# Patient Record
Sex: FEMALE | Race: WHITE | NOT HISPANIC OR LATINO | Employment: OTHER
[De-identification: names, ages, dates, MRNs, and addresses within clinical notes are randomized per-mention and may not be internally consistent; named-entity substitution may affect disease eponyms.]

---

## 2018-04-16 ENCOUNTER — TRANSCRIBE ORDERS (OUTPATIENT)
Dept: SCHEDULING | Age: 68
End: 2018-04-16

## 2018-04-16 DIAGNOSIS — M05.9 SEROPOSITIVE RHEUMATOID ARTHRITIS (CMS/HCC): Primary | ICD-10-CM

## 2018-04-18 ENCOUNTER — APPOINTMENT (OUTPATIENT)
Dept: RADIOLOGY | Age: 68
End: 2018-04-18
Attending: INTERNAL MEDICINE
Payer: COMMERCIAL

## 2018-04-18 DIAGNOSIS — M05.9 SEROPOSITIVE RHEUMATOID ARTHRITIS (CMS/HCC): ICD-10-CM

## 2018-04-18 PROCEDURE — 73564 X-RAY EXAM KNEE 4 OR MORE: CPT | Mod: RT

## 2018-08-09 ENCOUNTER — TRANSCRIBE ORDERS (OUTPATIENT)
Dept: SCHEDULING | Age: 68
End: 2018-08-09

## 2018-08-09 DIAGNOSIS — R94.5 ABNORMAL RESULTS OF LIVER FUNCTION STUDIES: Primary | ICD-10-CM

## 2018-10-04 ENCOUNTER — TRANSCRIBE ORDERS (OUTPATIENT)
Dept: REGISTRATION | Age: 68
End: 2018-10-04

## 2018-10-04 ENCOUNTER — HOSPITAL ENCOUNTER (OUTPATIENT)
Dept: RADIOLOGY | Age: 68
Discharge: HOME | End: 2018-10-04
Attending: INTERNAL MEDICINE
Payer: COMMERCIAL

## 2018-10-04 DIAGNOSIS — R94.5 ABNORMAL RESULTS OF LIVER FUNCTION STUDIES: ICD-10-CM

## 2018-10-04 PROCEDURE — 76700 US EXAM ABDOM COMPLETE: CPT

## 2019-02-11 ENCOUNTER — TRANSCRIBE ORDERS (OUTPATIENT)
Dept: SCHEDULING | Age: 69
End: 2019-02-11

## 2019-02-11 DIAGNOSIS — K76.0 FATTY (CHANGE OF) LIVER, NOT ELSEWHERE CLASSIFIED: Primary | ICD-10-CM

## 2019-04-02 ENCOUNTER — TRANSCRIBE ORDERS (OUTPATIENT)
Dept: SCHEDULING | Age: 69
End: 2019-04-02

## 2019-04-02 DIAGNOSIS — K76.0 FATTY (CHANGE OF) LIVER, NOT ELSEWHERE CLASSIFIED: Primary | ICD-10-CM

## 2019-04-18 ENCOUNTER — HOSPITAL ENCOUNTER (OUTPATIENT)
Dept: RADIOLOGY | Facility: HOSPITAL | Age: 69
Discharge: HOME | End: 2019-04-18
Attending: INTERNAL MEDICINE
Payer: COMMERCIAL

## 2019-04-18 DIAGNOSIS — K76.0 FATTY (CHANGE OF) LIVER, NOT ELSEWHERE CLASSIFIED: ICD-10-CM

## 2019-04-18 PROCEDURE — 76705 ECHO EXAM OF ABDOMEN: CPT

## 2019-07-16 ENCOUNTER — TRANSCRIBE ORDERS (OUTPATIENT)
Dept: SCHEDULING | Age: 69
End: 2019-07-16

## 2019-08-15 ENCOUNTER — TRANSCRIBE ORDERS (OUTPATIENT)
Dept: SCHEDULING | Age: 69
End: 2019-08-15

## 2019-08-15 DIAGNOSIS — Z12.31 ENCOUNTER FOR SCREENING MAMMOGRAM FOR MALIGNANT NEOPLASM OF BREAST: Primary | ICD-10-CM

## 2019-08-15 DIAGNOSIS — M81.0 AGE-RELATED OSTEOPOROSIS WITHOUT CURRENT PATHOLOGICAL FRACTURE: Primary | ICD-10-CM

## 2019-08-21 ENCOUNTER — HOSPITAL ENCOUNTER (OUTPATIENT)
Dept: RADIOLOGY | Age: 69
Discharge: HOME | End: 2019-08-21
Attending: INTERNAL MEDICINE
Payer: COMMERCIAL

## 2019-08-21 DIAGNOSIS — Z12.31 ENCOUNTER FOR SCREENING MAMMOGRAM FOR MALIGNANT NEOPLASM OF BREAST: ICD-10-CM

## 2019-08-21 PROCEDURE — 77063 BREAST TOMOSYNTHESIS BI: CPT

## 2019-09-25 ENCOUNTER — HOSPITAL ENCOUNTER (OUTPATIENT)
Dept: RADIOLOGY | Age: 69
Discharge: HOME | End: 2019-09-25
Attending: INTERNAL MEDICINE
Payer: COMMERCIAL

## 2019-09-25 DIAGNOSIS — M81.0 AGE-RELATED OSTEOPOROSIS WITHOUT CURRENT PATHOLOGICAL FRACTURE: ICD-10-CM

## 2019-09-25 PROCEDURE — 77080 DXA BONE DENSITY AXIAL: CPT

## 2020-02-13 ENCOUNTER — TRANSCRIBE ORDERS (OUTPATIENT)
Dept: SCHEDULING | Age: 70
End: 2020-02-13

## 2020-02-13 DIAGNOSIS — M54.9 DORSALGIA, UNSPECIFIED: Primary | ICD-10-CM

## 2020-02-19 ENCOUNTER — HOSPITAL ENCOUNTER (OUTPATIENT)
Dept: RADIOLOGY | Age: 70
Discharge: HOME | End: 2020-02-19
Attending: INTERNAL MEDICINE
Payer: COMMERCIAL

## 2020-02-19 DIAGNOSIS — M54.9 DORSALGIA, UNSPECIFIED: ICD-10-CM

## 2020-02-19 PROCEDURE — 72100 X-RAY EXAM L-S SPINE 2/3 VWS: CPT

## 2020-08-27 ENCOUNTER — TRANSCRIBE ORDERS (OUTPATIENT)
Dept: SCHEDULING | Age: 70
End: 2020-08-27

## 2020-08-27 DIAGNOSIS — Z12.31 ENCOUNTER FOR SCREENING MAMMOGRAM FOR MALIGNANT NEOPLASM OF BREAST: Primary | ICD-10-CM

## 2020-09-03 ENCOUNTER — HOSPITAL ENCOUNTER (OUTPATIENT)
Dept: RADIOLOGY | Age: 70
Discharge: HOME | End: 2020-09-03
Attending: INTERNAL MEDICINE
Payer: COMMERCIAL

## 2020-09-03 DIAGNOSIS — Z12.31 ENCOUNTER FOR SCREENING MAMMOGRAM FOR MALIGNANT NEOPLASM OF BREAST: ICD-10-CM

## 2020-09-03 PROCEDURE — 77063 BREAST TOMOSYNTHESIS BI: CPT

## 2020-10-23 ENCOUNTER — TRANSCRIBE ORDERS (OUTPATIENT)
Dept: LAB | Age: 70
End: 2020-10-23

## 2020-10-23 ENCOUNTER — HOSPITAL ENCOUNTER (OUTPATIENT)
Dept: RADIOLOGY | Age: 70
Discharge: HOME | End: 2020-10-23
Attending: PHYSICIAN ASSISTANT
Payer: COMMERCIAL

## 2020-10-23 DIAGNOSIS — M54.50 LOW BACK PAIN: Primary | ICD-10-CM

## 2020-10-23 DIAGNOSIS — M54.50 LOW BACK PAIN: ICD-10-CM

## 2020-10-23 PROCEDURE — 72100 X-RAY EXAM L-S SPINE 2/3 VWS: CPT

## 2021-09-20 ENCOUNTER — TRANSCRIBE ORDERS (OUTPATIENT)
Dept: SCHEDULING | Age: 71
End: 2021-09-20
Payer: COMMERCIAL

## 2021-09-20 DIAGNOSIS — Z12.31 ENCOUNTER FOR SCREENING MAMMOGRAM FOR MALIGNANT NEOPLASM OF BREAST: Primary | ICD-10-CM

## 2021-09-27 ENCOUNTER — TRANSCRIBE ORDERS (OUTPATIENT)
Dept: SCHEDULING | Age: 71
End: 2021-09-27
Payer: COMMERCIAL

## 2021-09-27 DIAGNOSIS — M81.0 AGE-RELATED OSTEOPOROSIS WITHOUT CURRENT PATHOLOGICAL FRACTURE: Primary | ICD-10-CM

## 2021-10-07 ENCOUNTER — HOSPITAL ENCOUNTER (OUTPATIENT)
Dept: RADIOLOGY | Age: 71
Discharge: HOME | End: 2021-10-07
Attending: INTERNAL MEDICINE
Payer: COMMERCIAL

## 2021-10-07 DIAGNOSIS — Z12.31 ENCOUNTER FOR SCREENING MAMMOGRAM FOR MALIGNANT NEOPLASM OF BREAST: ICD-10-CM

## 2021-10-07 PROCEDURE — 77067 SCR MAMMO BI INCL CAD: CPT

## 2021-11-08 ENCOUNTER — HOSPITAL ENCOUNTER (OUTPATIENT)
Dept: RADIOLOGY | Age: 71
Discharge: HOME | End: 2021-11-08
Attending: INTERNAL MEDICINE
Payer: COMMERCIAL

## 2021-11-08 DIAGNOSIS — M81.0 AGE-RELATED OSTEOPOROSIS WITHOUT CURRENT PATHOLOGICAL FRACTURE: ICD-10-CM

## 2021-11-08 PROCEDURE — 77080 DXA BONE DENSITY AXIAL: CPT

## 2022-07-15 ENCOUNTER — HOSPITAL ENCOUNTER (OUTPATIENT)
Dept: RADIOLOGY | Age: 72
Discharge: HOME | End: 2022-07-15
Attending: INTERNAL MEDICINE
Payer: COMMERCIAL

## 2022-07-15 ENCOUNTER — TRANSCRIBE ORDERS (OUTPATIENT)
Dept: SCHEDULING | Age: 72
End: 2022-07-15

## 2022-07-15 DIAGNOSIS — M54.9 DORSALGIA, UNSPECIFIED: ICD-10-CM

## 2022-07-15 DIAGNOSIS — M54.9 DORSALGIA, UNSPECIFIED: Primary | ICD-10-CM

## 2022-07-15 PROCEDURE — 72110 X-RAY EXAM L-2 SPINE 4/>VWS: CPT

## 2022-10-14 ENCOUNTER — TRANSCRIBE ORDERS (OUTPATIENT)
Dept: RADIOLOGY | Age: 72
End: 2022-10-14

## 2022-10-14 ENCOUNTER — HOSPITAL ENCOUNTER (OUTPATIENT)
Dept: RADIOLOGY | Age: 72
Discharge: HOME | End: 2022-10-14
Attending: INTERNAL MEDICINE
Payer: COMMERCIAL

## 2022-10-14 DIAGNOSIS — Z12.31 ENCOUNTER FOR SCREENING MAMMOGRAM FOR MALIGNANT NEOPLASM OF BREAST: ICD-10-CM

## 2022-10-14 DIAGNOSIS — Z12.31 ENCOUNTER FOR SCREENING MAMMOGRAM FOR MALIGNANT NEOPLASM OF BREAST: Primary | ICD-10-CM

## 2022-10-14 PROCEDURE — 77067 SCR MAMMO BI INCL CAD: CPT

## 2023-07-17 ENCOUNTER — TRANSCRIBE ORDERS (OUTPATIENT)
Dept: SCHEDULING | Age: 73
End: 2023-07-17

## 2023-07-17 DIAGNOSIS — M85.88 OTHER SPECIFIED DISORDERS OF BONE DENSITY AND STRUCTURE, OTHER SITE: ICD-10-CM

## 2023-07-17 DIAGNOSIS — Z12.31 ENCOUNTER FOR SCREENING MAMMOGRAM FOR MALIGNANT NEOPLASM OF BREAST: Primary | ICD-10-CM

## 2024-03-19 ENCOUNTER — OFFICE VISIT (OUTPATIENT)
Dept: NEUROLOGY | Facility: CLINIC | Age: 74
End: 2024-03-19
Payer: COMMERCIAL

## 2024-03-19 VITALS
OXYGEN SATURATION: 98 % | WEIGHT: 149 LBS | HEART RATE: 77 BPM | HEIGHT: 62 IN | SYSTOLIC BLOOD PRESSURE: 138 MMHG | DIASTOLIC BLOOD PRESSURE: 82 MMHG | BODY MASS INDEX: 27.42 KG/M2

## 2024-03-19 DIAGNOSIS — G31.84 MILD COGNITIVE IMPAIRMENT: Primary | ICD-10-CM

## 2024-03-19 PROBLEM — I10 HTN (HYPERTENSION), BENIGN: Status: ACTIVE | Noted: 2024-03-18

## 2024-03-19 PROCEDURE — 3075F SYST BP GE 130 - 139MM HG: CPT | Performed by: PSYCHIATRY & NEUROLOGY

## 2024-03-19 PROCEDURE — 3079F DIAST BP 80-89 MM HG: CPT | Performed by: PSYCHIATRY & NEUROLOGY

## 2024-03-19 PROCEDURE — 99205 OFFICE O/P NEW HI 60 MIN: CPT | Performed by: PSYCHIATRY & NEUROLOGY

## 2024-03-19 PROCEDURE — 3008F BODY MASS INDEX DOCD: CPT | Performed by: PSYCHIATRY & NEUROLOGY

## 2024-03-19 RX ORDER — VIT C/E/ZN/COPPR/LUTEIN/ZEAXAN 250MG-90MG
CAPSULE ORAL DAILY
COMMUNITY
End: 2025-01-21 | Stop reason: ALTCHOICE

## 2024-03-19 RX ORDER — AMLODIPINE BESYLATE 10 MG/1
1 TABLET ORAL DAILY
COMMUNITY
Start: 2024-01-02

## 2024-03-19 RX ORDER — ADALIMUMAB 40MG/0.4ML
40 KIT SUBCUTANEOUS
COMMUNITY

## 2024-03-19 RX ORDER — TURMERIC 400 MG
CAPSULE ORAL
COMMUNITY
End: 2025-01-21 | Stop reason: ALTCHOICE

## 2024-03-19 RX ORDER — CHOLECALCIFEROL (VITAMIN D3) 50 MCG
2000 TABLET ORAL DAILY
COMMUNITY
End: 2025-01-21 | Stop reason: ALTCHOICE

## 2024-03-19 RX ORDER — DULOXETIN HYDROCHLORIDE 30 MG/1
CAPSULE, DELAYED RELEASE ORAL
COMMUNITY
Start: 2024-02-21

## 2024-03-19 RX ORDER — ASCORBIC ACID 125 MG
1 TABLET,CHEWABLE ORAL DAILY
COMMUNITY
End: 2025-01-21 | Stop reason: ALTCHOICE

## 2024-03-19 RX ORDER — FOLIC ACID 0.4 MG
400 TABLET ORAL DAILY
COMMUNITY
End: 2025-01-21 | Stop reason: ALTCHOICE

## 2024-03-19 NOTE — PROGRESS NOTES
"  Neurology Consult Note    Subjective     Stephanie Sorto is a 73 y.o. female evaluated regarding memory loss.    Stephanie is right-hand dominant.  She grew up in [place].  She had some college and then went to [employer] school.  She worked as a  and is a .  She  and moved to [place], [place].  She then .  She moved to [place] 17 years ago to help her daughter with their .  She lives with the daughter for 6 years.  She now lives independently in a senior apartment.  She complains \"I feel like I am losing memory\".  \"When I first started forgetting things I was panicked\".  She recalls talking to a friend about her oldest grandchild and could not think of the grandchild's name.  She says that she is \"writing everything down\".  This has been going on for 6 months or more.  She manages all of her own finances and affairs.  She has no difficulty with independent function.  She says she generally sleeps well but did not sleep last night as she was anxious about this appointment.  She is also did not sleep well Saturday night.  She says she did not remember her daughter's birthday this year.  \"Sometimes I think I have too much time on my hands\".  She mentioned that her birth mother had dementia but is uncertain at what age.  She did repeat this.    Medical History:   Past Medical History:   Diagnosis Date    HLD (hyperlipidemia)     RA (rheumatoid arthritis) (CMS/[facility])        Surgical History: No past surgical history on file.    Allergies: Sulfa (sulfonamide antibiotics)    Current Outpatient Medications   Medication Sig Dispense Refill    adalimumab (HUMIRA,CF,) 40 mg/0.4 mL syringe kit Inject 40 mg under the skin.      amLODIPine (NORVASC) 10 mg tablet Take 1 tablet by mouth daily.      calcium carbonate (CALCIUM 600 ORAL) Take by mouth.      cholecalciferol, vitamin D3, 50 mcg (2,000 unit) tablet Take 2,000 Units by mouth daily.      DULoxetine (CYMBALTA) 30 mg capsule take " "1 capsule by mouth daily as directed      folic acid (FOLVITE) 400 mcg tablet Take 400 mcg by mouth daily.      magnesium citrate 125 mg capsule Take 1 capsule by mouth daily.      predniSONE 2 mg tablet,delayed release (DR/EC) Take by mouth.      turmeric 400 mg capsule Take by mouth.      vitamin E, dl,tocopheryl acet, (vitamin E, dl, acetate,) 180 mg capsule Take by mouth daily.       No current facility-administered medications for this visit.         Family History:   Family History   Problem Relation Age of Onset    Dementia Biological Mother     Arthritis Biological Sister        Social History:   Social History     Socioeconomic History    Marital status: Single     Spouse name: None    Number of children: None    Years of education: None    Highest education level: None   Tobacco Use    Smoking status: Never    Smokeless tobacco: Never   Substance and Sexual Activity    Alcohol use: Never       Review of Systems  Constitutional: negative  Eyes: negative  Ears, nose, mouth, throat, and face: negative  Respiratory: negative  Cardiovascular: negative  Gastrointestinal: negative  Genitourinary:negative  Integument/breast: negative  Hematologic/lymphatic: negative  Musculoskeletal:positive for joint pains, has had both knees injected  Neurological: negative  Behavioral/Psych: negative  Endocrine: negative  Allergic/Immunologic: negative        Objective     Physical Exam  Visit Vitals  /82   Pulse 77   Ht 1.575 m (5' 2\")   Wt 67.6 kg (149 lb)   SpO2 98%   BMI 27.25 kg/m²       General Appearance:  Alert, no distress, appears stated age   Head:  Normocephalic, atraumatic   Eyes:  PERRL,  EOM's intact, fundi benign                 Neck: Supple,  no carotid bruit         Lungs:  Clear to auscultation bilaterally, respirations unlabored       Heart Regular rate and rhythm, S1 and S2 normal, no murmur       Extremities:  no clubbing, cyanosis or edema    Musculoskeletal: No injury or deformity       Skin: Skin " color, texture, turgor normal; no rashes or lesions   Behavior/Emotional: Appropriate, cooperative   Neurologic Exam:  Higher cortical function:  Alert and conversant.  Language intact.  Attention, concentration, memory impaired.  MMSE 24/30.  Verbal recall 0/3 spontaneously at 5 minutes and 2/3 with categorical hints.  She could not think of the year.  She has trouble spelling world backwards.      Cranial nerve exam:  Pupils equal round and reactive to light. Extraocular movement full with normal pursuit and saccades. No nystagmus.  Facial movement and sensation is normal. Hearing intact.  The tongue and uvula were midline. Speech clear.     Motor exam:  Normal strength and rapid movements throughout. No pronator drift.There was normal bulk and tone with no abnormal movements.     Reflexes : 1+ and symmetric with absent ankles and flexor plantar responses.     Sensory examination: intact to vibration and graphaesthesia.    Cerebellar exam: no dysmetria.     Gait: Narrow based and independent though mildly antalgic.       Assessment and Plan    Mild cognitive impairment  Patient has been forgetful though is functioning independently.  Mini-Mental state exam score was 24/30.  Her spontaneous verbal recall was 0/3 at 5 minutes.  She could not think of the year.  It took her a long time to come up with the name of her rheumatologist.  She has concerns and that her birth mother had dementia.  I have asked her to undergo MRI brain scan as well as neuropsychological testing.  I will see her thereafter.      Bruce Cherry MD  8:50 AM

## 2024-03-19 NOTE — ASSESSMENT & PLAN NOTE
Patient has been forgetful though is functioning independently.  Mini-Mental state exam score was 24/30.  Her spontaneous verbal recall was 0/3 at 5 minutes.  She could not think of the year.  It took her a long time to come up with the name of her rheumatologist.  She has concerns in that her birth mother had dementia.  I have asked her to undergo MRI brain scan as well as neuropsychological testing.  I will see her thereafter.

## 2024-04-04 ENCOUNTER — HOSPITAL ENCOUNTER (OUTPATIENT)
Dept: RADIOLOGY | Age: 74
Discharge: HOME | End: 2024-04-04
Attending: PSYCHIATRY & NEUROLOGY
Payer: COMMERCIAL

## 2024-04-04 DIAGNOSIS — G31.84 MILD COGNITIVE IMPAIRMENT: ICD-10-CM

## 2024-04-08 ENCOUNTER — TELEPHONE (OUTPATIENT)
Dept: NEUROLOGY | Facility: CLINIC | Age: 74
End: 2024-04-08

## 2024-04-09 NOTE — TELEPHONE ENCOUNTER
MRI reviewed.  This reported: Mild to moderate small vessel ischemic changes with age-related volume loss.   To my review this was at least moderate small vessel ischemic change and there were enlarged temporal horns of the lateral ventricles.  I phoned the patient and discussed her MRI.  I asked if she had scheduled her neuropsychological testing.  She did not know what I was talking about in this regard.  Please assist this patient in setting up neuropsychological testing.  Her memory issues have gotten in the way of her being able to successfully do so.

## 2024-07-09 ENCOUNTER — OFFICE VISIT (OUTPATIENT)
Dept: NEUROLOGY | Facility: CLINIC | Age: 74
End: 2024-07-09
Payer: COMMERCIAL

## 2024-07-09 VITALS
HEIGHT: 62 IN | DIASTOLIC BLOOD PRESSURE: 80 MMHG | SYSTOLIC BLOOD PRESSURE: 142 MMHG | BODY MASS INDEX: 30 KG/M2 | OXYGEN SATURATION: 96 % | HEART RATE: 79 BPM | WEIGHT: 163 LBS

## 2024-07-09 DIAGNOSIS — G31.84 MILD COGNITIVE IMPAIRMENT: Primary | ICD-10-CM

## 2024-07-09 PROCEDURE — 3079F DIAST BP 80-89 MM HG: CPT | Performed by: PSYCHIATRY & NEUROLOGY

## 2024-07-09 PROCEDURE — 3008F BODY MASS INDEX DOCD: CPT | Performed by: PSYCHIATRY & NEUROLOGY

## 2024-07-09 PROCEDURE — 99215 OFFICE O/P EST HI 40 MIN: CPT | Performed by: PSYCHIATRY & NEUROLOGY

## 2024-07-09 PROCEDURE — 3077F SYST BP >= 140 MM HG: CPT | Performed by: PSYCHIATRY & NEUROLOGY

## 2024-07-09 NOTE — ASSESSMENT & PLAN NOTE
The patient has had cognitive impairments noted by family.  She was the victim of the financial scam and now the daughter is her POA.  Apparently patient has not been taking any medication.  She has had problems with alcoholism in the past.  It is unclear how much alcohol she has been recently consuming.  Her cognitive impairment could well be on a vascular basis given the moderate chronic ischemic changes on MRI brain.  There could also be effects of chronic alcohol exposure.  Certainly the patient should be on vascular risk factor control and antiplatelet therapy.  I have asked her to undergo formal neuropsychological testing.  I will see her thereafter.

## 2024-07-09 NOTE — PROGRESS NOTES
"Neurology Progress Note    Subjective     Stephanie Sorto is a 73 y.o. female evaluated regarding memory loss.  She comes in today with her daughter and Latisha.     Stephanie was initially evaluated 3/19/2024.  She is right-hand dominant.  She grew up in [place].  She had some college and then went to [employer] school.  She worked as a  and is a .  She  and moved to [place], [place].  She then .  She moved to [place] 17 years ago to help her daughter with their .  She lived with the daughter for 6 years and then moved independently in a senior apartment.  She complained \"I feel like I am losing memory\".  \"When I first started forgetting things I was panicked\".  She recalled talking to a friend about her oldest grandchild and could not think of the grandchild's name.  She was \"writing everything down\" for 6 months or more.  She managed all of her own finances and affairs.  She had no difficulty with independent function.  She generally slept well but did not sleep last night as she was anxious about this appointment.  She also did not sleep well Saturday night.  She says she did not remember her daughter's birthday this year.  \"Sometimes I think I have too much time on my hands\".  She mentioned that her birth mother had dementia but was uncertain at what age.  She did repeat this.  MMSE was 24/30 with verbal recall 0/3 spontaneously at 5 minutes and 2/3 with categorical hints.  I felt she had mild cognitive impairment and asked her to undergo MRI brain and neuropsychological testing.   She comes in today with her daughter.  MRI brain 2024 reported mild to moderate small vessel ischemic changes with age-related volume loss.  To my review this was at least moderately severe.  Patient was seen at the [facility] ER 2024 after mental status change at a restaurant after 2 martinis.  She had alcohol intoxication with level 265.  She does have a history of " being treated for alcoholism in her earlier adulthood per the daughter.  Apparently the patient was a victim of a financial scam.  The daughter got a call from the bank when the patient asked for alone.  After going to [employer] she was directed to the [place] police department where the patient made a report but seemed confused according to the police and the daughter was called.  Daughter is now recent POA.  The daughter notices more short-term memory loss.  Patient is having difficulty adjusting to having a new bank.  1 day she made 10 5 calls to the daughter.  She asked the same question repeatedly. She did not recall seeing her PCP Dr. Mack 5/29/2024.  The patient is not taking any medications although these have been prescribed.      Medical History:   Past Medical History:   Diagnosis Date    HLD (hyperlipidemia)     RA (rheumatoid arthritis) (CMS/[facility])        Surgical History: No past surgical history on file.    Allergies: Sulfa (sulfonamide antibiotics)    Current Outpatient Medications   Medication Sig Dispense Refill    adalimumab (HUMIRA,CF,) 40 mg/0.4 mL syringe kit Inject 40 mg under the skin.      amLODIPine (NORVASC) 10 mg tablet Take 1 tablet by mouth daily.      calcium carbonate (CALCIUM 600 ORAL) Take by mouth.      cholecalciferol, vitamin D3, 50 mcg (2,000 unit) tablet Take 2,000 Units by mouth daily.      DULoxetine (CYMBALTA) 30 mg capsule take 1 capsule by mouth daily as directed      folic acid (FOLVITE) 400 mcg tablet Take 400 mcg by mouth daily.      magnesium citrate 125 mg capsule Take 1 capsule by mouth daily.      predniSONE 2 mg tablet,delayed release (DR/EC) Take by mouth.      turmeric 400 mg capsule Take by mouth.      vitamin E, dl,tocopheryl acet, (vitamin E, dl, acetate,) 180 mg capsule Take by mouth daily.       No current facility-administered medications for this visit.         Family History:   Family History   Problem Relation Age of Onset    Dementia Biological  "Mother     Arthritis Biological Sister        Social History:   Social History     Socioeconomic History    Marital status: Single     Spouse name: None    Number of children: None    Years of education: None    Highest education level: None   Tobacco Use    Smoking status: Never    Smokeless tobacco: Never   Substance and Sexual Activity    Alcohol use: Never         Objective     Physical Exam  Visit Vitals  BP (!) 142/80   Pulse 79   Ht 1.575 m (5' 2\")   Wt 73.9 kg (163 lb)   SpO2 96%   BMI 29.81 kg/m²     Higher cortical function: Mild cognitive impairment,, language intact.  Cranial nerves: Cranial nerves II through XII normal.  Motor: Normal strength and rapid movements throughout.    Cerebellum: No dysmetria.  Gait: Within normal limits.  General: Awake, alert, in no apparent distress.  HEENT: Normocephalic atraumatic.  Eyes: Orbits benign,  extraocular motions full.  Neck : Supple, no carotid bruit.    Lungs: Clear bilaterally.  Heart: S1 and S2 normal, regular rate and rhythm, no murmur.  Extremities: No clubbing, cyanosis, or edema.  Musculoskeletal: No injury or deformity.  Psychiatric: Appropriate and cooperative    Assessment and Plan    Mild cognitive impairment  The patient has had cognitive impairments noted by family.  She was the victim of the financial scam and now the daughter is her POA.  Apparently patient has not been taking any medication.  She has had problems with alcoholism in the past.  It is unclear how much alcohol she has been recently consuming.  Her cognitive impairment could well be on a vascular basis given the moderate chronic ischemic changes on MRI brain.  There could also be effects of chronic alcohol exposure.  Certainly the patient should be on vascular risk factor control and antiplatelet therapy.  I have asked her to undergo formal neuropsychological testing.  I will see her thereafter.      Bruce Cherry MD  6:20 PM  "

## 2024-10-24 ENCOUNTER — HOSPITAL ENCOUNTER (OUTPATIENT)
Dept: PSYCHOLOGY | Facility: CLINIC | Age: 74
Discharge: HOME | End: 2024-10-24
Payer: COMMERCIAL

## 2024-10-24 DIAGNOSIS — F02.80 MAJOR NEUROCOGNITIVE DISORDER DUE TO MULTIPLE ETIOLOGIES WITHOUT BEHAVIORAL DISTURBANCE (CMS/HCC): Primary | ICD-10-CM

## 2024-10-24 PROCEDURE — 96132 NRPSYC TST EVAL PHYS/QHP 1ST: CPT | Performed by: PSYCHOLOGIST

## 2024-10-24 PROCEDURE — 96133 NRPSYC TST EVAL PHYS/QHP EA: CPT | Performed by: PSYCHOLOGIST

## 2024-10-24 PROCEDURE — 96137 PSYCL/NRPSYC TST PHY/QHP EA: CPT | Performed by: PSYCHOLOGIST

## 2024-10-24 PROCEDURE — 96136 PSYCL/NRPSYC TST PHY/QHP 1ST: CPT | Performed by: PSYCHOLOGIST

## 2024-10-24 NOTE — PROGRESS NOTES
"NEUROPSYCHOLOGICAL EVALUATION    CONFIDENTIAL   FOR PROFESSIONAL USE ONLY    Name: Stephanie Sorto                              : 1950  Evaluation date: 10/24/2024    Stephanie Sorto, : 1950, is a 73 y.o. right-handed female with 13 years of formal education, referred by Neurologist Dr. Bruce Cherry MD for a comprehensive neuropsychological evaluation.    The following information was obtained via clinical interview with the patient and her daughter Latisha (with the patient's consent) on 10/24/2024 and a review of available medical records, though this should not be considered a comprehensive review and the reader is referred to the patient's complete medical record for a thorough summary. Verbal informed consent was obtained after a review/discussion of reasons for this evaluation, evaluation procedures, and issues regarding professional records and confidentiality.    HISTORY OF PRESENTING PROBLEM:  Per medical records, Ms. Sorto follows with neurologist Dr. Cherry for memory difficulty.  MMSE in 2024 was 24/30 with verbal recall 0/3 spontaneously at 5 minutes and 2/3 with categorical hints.  MRI in 2024 reported mild to moderate small vessel ischemic changes with age-related volume loss; Dr. Cherry reported that to his review, \"this was at least moderately severe.\"  Ms. Sorto was seen at [facility] ED in 2024 after mental status change at a restaurant after 2 martinis.  She was found to have alcohol intoxication with level 265.  She reportedly had alcohol use problems in the past, but is unclear how much alcohol she was recently consuming.  Her daughter recently became POA after she was the victim of financial scam.  At her appointment with Dr. Cherry in 2024, it was noted that she did not recall seeing her PCP Dr. Mack in May.  She reported at that appointment that she had not been taking any medications even though she is prescribed " "medications; it is unclear how long she was noncompliant.  Per Dr. Cherry's impression, \"her cognitive impairment could be on a vascular basis given the moderate chronic ischemic changes on MRI brain.  There could also be effects of chronic alcohol exposure.\"  Ms. Sorto has not undergone previous neuropsychological evaluation.    At the current appointment, her daughter Latisha described several years of memory decline and indicated that initially her mother used to become very upset about memory trouble, but now is less upset and just says \"my brain does not work.\"  Ms. Sorto feels that her memory difficulty is more age-related and stated that the neurologist told her it was normal aging; Latisha corrected her and said that Dr. Cherry had not told her it was normal aging.  Ms. Sorto feels cues are helpful in jogging her memory, but her daughter believes that cues are usually not helpful.  Latisha stated her mother is repetitive with questions and she now has difficulty with technology-things that she used to be able to do but no longer is able to understand.  She provided examples including logging into [employer], not understanding what email was, and trouble navigating the Internet.  They got rid of her smart phone in favor of a flip phone.  Regarding language, occasional word finding and comprehension difficulty were reported.  Regarding attention and processing, Latisha stated that her mother has trouble understanding and reasoning through new information.  She provided an example of getting her mother a separate bank card for her Social Security, but Ms. Sorto is unable to understand that it is not linked to her bank account and has tried to go to her bank to replace the card or take out cash instead of using her debit card. Latisha stated that information \"is not sinking in\" despite multiple explanations.  She indicated that her mother has \"good days and bad days,\" and noted that when her mother becomes " "upset, worried, or anxious, her cognition is much worse.  She may not be able to get out her words or thoughts.  Of note, Ms. Sorto fell for several financial scams, including believing she was dating Jensen Moore for over a year.  Latisha stated that multiple people reached out to her with concerns, including her mother's bank and a .  Latisha is now POA and the family had to freeze her credit, change chau (fired from a bank in [place] because she was sending money to [employer]), and even changed her telephone number.  When queried, Ms. Sorto demonstrated some awareness of the scams, but then later during the intake told the examiner \"that reminds me of something Jensen said to me.\"    Ms. Sorto lives in an independent apartment in a half-way community.  No difficulty with ADLs were reported, but changes in IADLs were indicated.  As noted above, her daughter has recently become her POA.  Ms. Sorto still writes checks for bills, but has trouble with her debit card.  She used to be a  and used to love to cook, but stated it is no longer satisfying to cook for 1 person so she purchases a lot of pre-prepared food.  She denied difficulty with recipes, but stated very occasional instances of forgetting to turn off the stove or water.  Regarding medication management, she was unable to state her medications or the conditions for which she is prescribed medications; even with prompting, she had trouble recognizing medications and even denied taking blood pressure medications.  She denied trouble remembering to take her meds, but then admitted that some days she forgets if she is out of her normal routine.  She does use a pillbox.  Ms. Sorto rarely drives as she does not have a car, but she has a friend who lets her use her vehicle.  She denied concerns with disorientation or difficulty with vision while driving.  Latisha stated that she is concerned about her mother's driving, and was " "recently following her in another car and her mother seemed \"a little all over the place, almost like she was distracted.\"  Latisha stated that her children have also commented on her mother's driving and are nervous to ride with her.    MEDICATION:  Acetaminophen (tylenol extra strength) 500 mg tablet, take 1 tablet by mouth daily.   Aleve pm 220-25 mg oral tablet (naproxen sod-diphenhydramine), take 1 tablet by mouth if needed.   Amlodipine 10 mg tablet, take 1 tablet by mouth daily.   Ascorbic acid 500 mg tablet, take 500 mg by mouth daily.   Cholecalciferol (vitamin d) 2000 units tablet, take 2,000 units by mouth daily.   Duloxetine 30 mg dr capsule, take 1 capsule by mouth daily as directed   Folic acid 1 mg tablet, take 1 mg by mouth daily.   Humira 40 mg/0.4 mL  Magnesium citrate 125 mg oral capsule, take 1 capsule by mouth daily.   Rosuvastatin 5 mg tablet, take 1 tablet by mouth daily at bedtime.     Ms. Sorto is also prescribed methotrexate by her rheumatologist, but dosage was unavailable.    MEDICAL HISTORY:  Ms. Sorto stated that to her knowledge, she was the product of an uncomplicated pregnancy and delivery and met developmental milestones appropriately. Medical history is significant for rheumatoid arthritis, hypertension, hyperlipidemia, and Vitamin D deficiency. She follows with Rheumatology (Flor Georges DO).  Latisha stated that her mother contracted COVID in 2021 or 2022 with moderate symptoms; she was not hospitalized, but was prescribed Paxlovid and was sick the first time for quite some time.  Latisha noted possible mild cognitive change after her illness.  No tremors or weakness was reported.  Ms. Sorto experienced frequent falls related to knee trouble, and now uses a rollator walker; Latisha stated that she has still fallen even with the rollator.  They denied history of head injury, stroke, and seizure.  MRI brain without contrast (2024) showed chronic microhemorrhages in the " "bilateral thalami and chronic lacunar infarcts in the right thalamus and right midbrain.  Mild to moderate probable chronic ischemic changes were noted of the white matter, with ventricles and sulci mildly prominent but age-appropriate cerebral volume; per Dr. Cherry's review, he noted more moderate to severe changes.  Ms. Sorto described her sleep as good, but she and her daughter endorsed longstanding snoring.  She has never undergone a sleep study.  No REM sleep disorder behaviors were reported.  Daytime energy level was poor.  Appetite was cited as good with no changes.    SUBSTANCE USE HISTORY:  Ms. Sorto states she no longer drinks alcohol since her June 2024 visit to the ED due to intoxication.  Latisha reported her mother went to rehab for alcohol abuse about 40 years ago.  They deny withdrawal symptoms or seizures.  Ms. Sorto reported recreational marijuana use throughout adulthood, as recently as last year.  She does not use tobacco.  Caffeine includes 1 cup of coffee daily and occasional tea.    PSYCHIATRIC HISTORY:  Psychiatric history is significant for depression in the past.  She has participated in family therapy.  She has not been psychiatrically hospitalized.  Ms. Sorto described her recent mood as \"good.\"  Latisha denied current concerns with changes in personality or mood, but stated that during the period of time of financial scamming in which her mother thought she was in a relationship with Jensen Moore, her relationship with her family was very poor.  She stated that her mother was very secretive during this scam and sad during the \"break up,\" but she feels that her mother is coming out of it now.  Ms. Sorto and Latisha denied suicidal/homicidal ideation, giuliana, hallucinations, paranoia, and delusions.    FAMILY HISTORY:  Family neurologic history is significant for Alzheimer's disease in her biological mother, and autoimmune and possible seizures in her sister.  Family " psychiatric history is significant for anxiety and depression throughout her family, and she stated that her father was an alcoholic.    SOCIAL, EDUCATIONAL, AND OCCUPATIONAL HISTORY:  Ms. Sorto was born and raised in [place] with 2 brothers and 1 sister.  She stated that her birth mother left her family when she was young.  Ms. Sorto was  to her first  briefly and to her second  for 18 years before they .  She has 3 children and 8 grandchildren, with one-step great grandchild.  She stated that she spends a lot of time alone and watches a lot of TV.  She used to play cards and do more activities at her living facility, but no longer enjoys these activities as much.  She stated that she does have good friends in her building.  Ms. Sorto denied difficulty with learning or attention in school and earned B-average grades.  She completed 1 year of college/culinary school.  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  Her last job was with [employer] and she retired in 2015 to help care for her grandchildren.    BEHAVIORAL OBSERVATIONS:  Ms. Sorto arrived on time and was accompanied by her daughter Latisha . She was neatly dressed and groomed appropriately for the evaluation and appeared to maintain proper hygiene. Her gait was unremarkable, balance appeared grossly steady, and she ambulated with the assistance of a rollator walker . She is right-handed and no gross motor abnormalities were observed. Vision (corrected with glasses) and hearing appeared adequate for the purposes of the evaluation. Eye contact was good. Ms. Sorto was very pleasant and cooperative and mood and affect were appropriate. Rapport was easily established and maintained. Speech was generally fluent, prosodic, and non-paraphasic, with occasional word-finding  difficulty. Thought processes were logical and goal-directed and she served as a variable historian with assistance from her daughter. She showed intact comprehension of conversational speech but required repetition and elaboration of test instructions. She maintained adequate attention and did not demonstrate impulsive behavior. Qualitative memory difficulty was noted, as she could not recall how many children one of her daughters had. She made 3 repetition errors on a phonemic fluency task; 1 error on each of three 60-second trials.    Performance Validity: Ms. Sorto performed adequately on embedded measures of effort and the results are believed to accurately represent her current neurocognitive function.    ASSESSMENT INSTRUMENTS: Animal Fluency; [place] Verbal Learning Test, Third Edition, Brief (CVLT-3 Brief); Clinical Interview; Clock Drawing Test; Controlled Oral Word Association Test (COWAT, FAS); Generalized Anxiety Disorder 7-Item Scale (MARIA TERESA-7); [place] IADLs; Line Bisection; Mini Mental Status Exam (MMSE); Neuropsychological Assessment Battery (NAB; Language Module); Overlapping Figures; Patient Health Questionnaire (PHQ-9); Repeatable Battery for the Assessment of Neuropsychological Status (RBANS, Form A; selected subtests); Review of Records; Test of Practical Judgment (TOPJ-9); Trail Making Test A & B; Wechsler Adult Intelligence Scale, Fourth Edition (WAIS-IV; selected subtests); Wechsler Memory Scale, Fourth Edition (WMS-IV; selected subtests); Wide Range Achievement Test, Fourth Edition (WRAT-4; Word Reading).     SUMMARY OF TEST RESULTS:   The following standardized test scores and qualitative descriptors are used to describe test performance:     Standard Scores  Scaled Scores T-Scores Percentiles Classification   130 and above 16 and above 70 and above >98th Very High    120-129 14-15 63-69 91st to 97th High   110-119 12-13 57-62 75th to 90th High Average    8-11 43-56 25th to 74th  Average   80-89 6-7 37-42 9th to 24th Low Average   70-79 4-5 30-36 3rd to 8th Low    69 and below 3 and below 29 and below <2nd Very Low        These scores are for confidential and professional use only and should never be interpreted without consideration of the preceding integrated narrative report.      Test Raw Score Normative Score Description   Mental Status        MMSE 25/30 -- -- Within Normal Limits   Estimated Premorbid Functioning       WRAT-4 Word Reading  63  Average   Attention and Information Processing       WAIS-IV Working Memory Index          Digit Span 23 ss 9 Average         Forward 10 ss 10 Average         Backward 8 ss 10 Average         Sequencing 5 ss 7 Low Average   WAIS-IV Processing Speed Index          Symbol Search 19 ss 8 Average      Coding 40 ss 8 Average   Trail Making Test A 37” SS 97 Average   Executive Functioning       Trail Making Test B D/C SS -- Impaired   Clock Drawing Test 8/10 -- -- Borderline Impairment    TOPJ-9 21 SS 88 Low Average   Language        NAB Naming Test 29 T 47 Average   COWAT (FAS) 30 SS 88 Low Average   Animal Fluency 13 SS 78 Low   Visuospatial        Line Bisection -- -- -- Within Normal Limits   Overlapping Figures 13/13 -- -- Within Normal Limits   RBANS Figure Copy 14 ss 4 Low   Memory       CVLT-3 Brief          Trials 1 - 4 Correct (2-4-4-4) -- SS 58 Very Low      Short Delay Free Recall 2 ss 1 Very Low      Long Delay Free Recall 0 ss 1 Very Low      Long Delay Cued Recall 2 ss 1 Very Low      Recognition Discriminability  93 ss 9 Average   WMS-IV          Logical Memory I 19 ss 6 Low Average      Logical Memory II 6 ss 5 Low      LM Recognition 16 %ile 17-25 Low Average      Visual Reproduction I 20 ss 5 Low      Visual Reproduction II 4 ss 5 Low      VR Recognition  4 %ile 26-50 Average   Mood & Function       PHQ-9 2 -- -- Minimal Depression   MARIA TERESA-7 0 -- -- Minimal Anxiety   [place]ilda IADLs 7/8 -- -- Within Normal Limits     Mental Status  "Screening:  Ms. Sorto's performance was overall within normal limits on a cognitive screening measure (MMSE=25/30); she lost 4 points in orientation (stated date was November 13, did not know county the hospital was located in nor the county she lives in, and incorrect name of hospital: [facility]:) and 1 point for recalling 2/3 words after a brief delay.    Estimated Premorbid Function:  Single word reading ability was average.    Attention and Processing Speed:  Attention and processing speed were intact in the broad average range. When asked to repeat and manipulate progressively longer strings of digits, she performed in the average range. Processing speed on the WAIS-IV included average performances on tasks of rapid visual scanning and symbol-digit coding. General visual attention and scanning was average with 0 errors.    Executive Function:  Executive function was notable for weakness and impairment. Clock drawing was borderline impaired for hands drawn at 11 and 12 for \"10 minutes after 11.\" Mental flexibility was impaired for inability to complete the task secondary to confusion. When asked to provide problem-solving solutions to hypothetical real-world scenarios involving financial, safety, and medical decision-making, she performed in the low average range.    Language:  Language was notable for weakness. Object naming was average. Verbal fluency was low average for phonemic (letter) and low for semantic (category) fluency.    Visuoperceptual:  General visual fields and perception of overlapping figures were intact. Copy of a complex geometric figure was low for slight inaccuracies of details, but no obvious misperceptions.    Memory:   Abnormalities were noted in learning and free delayed recall . [place] verbal learning and delayed memory were very low. Ms. Sorto learned 2-4-4-4 items from a 9-item word list across 4 learning trials and freely recalled 0 items after a 10-minute delay; she " "recalled 2 items with category promts. Her recognition discriminability was average for 7 out of 9 words correctly recognized and 0 false positive errors. Contextual verbal learning of 2 stories was low average with low delayed recall but low average recognition memory. Visual learning and delayed memory of geometric designs were low, with average recognition.    Emotional/Behavior Rating: On self-report measures, Ms. Sorto denied significant symptoms of depression and anxiety. Latisha completed the Matilda, an informant-rated questionnaire regarding performance on instrumental activities of daily living (IADLs); she denied significant concerns with her mother's ability to use the telephone, shop independently, prepare adequate meals, manage housekeeping, perform laundry, travel independently, or manage day-to-day finances (needs help with banking, major purchases). She reported her mother needs reminding to take her medication.     SUMMARY AND DIAGNOSTIC IMPRESSION:  Stepahnie Sorto is a 73 y.o. female referred for a neuropsychological evaluation to assess cognitive complaints. Relevant medical history is significant for  rheumatoid arthritis, hypertension, hyperlipidemia, and Vitamin D deficiency.  MRI in April 2024 reported mild to moderate small vessel ischemic changes with age-related volume loss; Dr. Cherry reported that to his review, \"this was at least moderately severe.\"      On objective testing, Ms. Sorto demonstrated average estimated premorbid functioning.  Attention and processing speed were intact in the broad average range.  Weakness was noted in executive functioning, particularly in clock drawing and mental flexibility.  Language was notable for average object naming but low average phonemic fluency and low semantic fluency.  Visual-spatial ability was broadly intact; her low score on a complex geometric figure copy was related to small/imprecise details and not overt visual-perceptual " difficulty.  [place] verbal learning and free delayed recall for a word list were very low, with benefit from recognition cueing (average).  Contextual verbal learning for stories was low average with low free delayed recall but low average recognition.  Visual learning and delayed recall were low, with average recognition.  This memory profile suggests more difficulty with attention and executive aspects of memory, rather than accelerated forgetting per se.  Emotionally, Ms. Sorto denied significant symptoms of depression and anxiety.  Her daughter reported minimal concerns with IADLs.    Taken together, Ms. Sorto's neuropsychological profile represents decline from estimated premorbid functioning consistent with a neurocognitive disorder.  Specifically, she demonstrates difficulty in aspects of executive functioning, semantic fluency, and verbal and visual learning and free recall, with adequate recognition; she is not amnestic.  Qualitative concerns include falling for a financial scam (believing she was in a relationship with Jensen Moore), a period of time in which she was not taking prescribed medications, and some forgetfulness regarding clinical history which required her daughter to assist with providing information.  Etiology is likely multifactorial.  MRI (4/2024) showed moderately severe small vessel ischemic changes with age-related volume loss, per Dr. Cherry's review.  This suggests significant vascular burden, which may be playing a role in cognitive impairment.  Also of concern is chronic alcohol use, although this appears to be more remote and is unclear how much she has been drinking recently.  She is not amnestic on testing, given benefit from recognition cues; however, a progressive neurodegenerative process such as Alzheimer's disease cannot be ruled out given some of her other day-to-day memory concerns.  Recommendations follow below.    Diagnosis:  Major neurocognitive disorder due to  multiple etiologies    RECOMMENDATIONS:  Ms. Sorto should continue to follow-up with her PCP, Neurologist, and other specialty providers as needed for optimal health management.   She can discuss with her doctors whether a trial of a cholinesterase inhibitor is warranted, if not medically contraindicated.  She can discuss with her PCP whether a referral for a sleep study is indicated.    It is strongly recommended that her family monitor her ability to manage instrumental activities of daily living and continue to manage tasks for her as needed (e.g., financial management, medications, meal preparation, driving) to ensure continued accuracy and safety.   She should be referred for a driving evaluation to assess safety on the road. With a script from her doctor, she can schedule with [facility] by calling [phone]. Until then, it is recommended that she minimize driving unless absolutely necessary and keep to local, familiar locations.  Family should continue to closely monitor financial management to prevent additional scams.    Tips for managing day-to-day cognitive complaints include:  A written checklist of tasks for the day may be beneficial for establishing a daily schedule and encourage you to work on one task at a time and not begin a new task until the previous one is completed.   Break larger projects into smaller, more manageable tasks, and use a checklist to stay organized. Daily checklists can be written in a planner, or kept next to a large calendar in a central location where you will see it every day. Use highlighters or stickers to call attention to important deadlines.  Keep a calendar in a centralized location (e.g., in the kitchen, by the front door) to record appointments, bill due dates, and other information where it will be seen every day. Color-coding events may be further helpful for certain types of information to stand out.   Write important information in a  notebook or planner that you can carry with you to record information throughout the day. Keep notes in one place (not multiple post-its, pieces of paper, etc.).  Keep frequently used items in a consistent place (e.g., keys, wallet, paperwork).  Repetition is helpful for learning and internalizing information. Repeat information (e.g., important tasks, grocery lists, names of new acquaintances) aloud or to yourself several times before moving on in conversation or activity. This will cause your brain to pay more attention to the information, making it more likely that you will remember it.  Important information should be broken down into manageable chunks (i.e., short list) that is easier to take in without being overwhelming.   Providing context and linking [id number] information to knowledge already remembered will be easier than straight memorization (e.g., grouping information by category, mnemonic devices).  Your brain is like a muscle - use it or lose it! Participate in cognitively- and socially-stimulating activities such as reading, word puzzles, board games, and social interaction. Your local senior center can be a great resource for activities and socialization.    Resources for family include:  The 36-Hour Day by Kylee Collado MA and Victor Manuel Tong MD, MPH is a helpful resource for family and caregivers of individuals with neurocognitive disorders. It provides information, tips for managing behavior and cognitive difficulties, and considerations when planning for the future.  [facility] [email or web address] [phone]  Alzheimer's Association website www.alz.org is a great source of information about various types of dementia, planning, research, family/caregiver resources, local resources, and support groups.  Family Caregiver [place] is a great source of information about planning, family/caregiver resources, local resources, and support groups.  https://www.caregiver.org/  Memory Cafes are welcoming spaces where individuals and their families can socialize with other families who also have a loved one with dementia. Various locations can be found: [email or web address]     She is encouraged to practice good sleep hygiene habits, including:  going to bed and getting up at the same time daily, with a goal of getting 8-10 hours of quality sleep each night.  avoiding caffeine use within about 4-6 hours of planned bedtime.  engaging in the same calming bedtime activities each night during the 30-60 minutes before bed (e.g., hot shower, meditation/mindfulness, reading).  avoiding electronics 30-60 minutes before bed and never using electronic devices (i.e. cell phone, computer, tablet, etc.) in bed.  using relaxation strategies and mindfulness exercises before bed.  using the bed only for sleep.  creating a sleep environment that is quiet, dark, cool, and calming.    Ms. Sorto is encouraged to maintain a healthy diet and exercise, as approved by her physician. Healthy diet (such as the DASH or Mediterranean Diets) and exercise are not only beneficial for physical health, but research has demonstrated significant positive impact on mental health and cognitive function.    This evaluation may serve as a baseline from which to track cognition over time. Neuropsychological reevaluation in 12-18 months is recommended for diagnostic clarification and treatment planning.        These results and recommendations were reviewed with Ms. Sorto and her daughter during a feedback session, and all questions were answered to their satisfaction. Thank you for the opportunity to participate in Stephanie Sorto's care. Please feel free to contact me at [phone] with any further questions regarding this evaluation.      Respectfully Submitted,     Katelin Hagan Psy.D.  Licensed Clinical Neuropsychologist       This note  was created with voice recognition software. Inadvertent dictation errors should be disregarded. Please contact my office for clarification.    A total of 5 hours 23 minutes was spent in chart review, test selection, clinical interview, test administration and scoring, clinical interpretation, report writing, and feedback.     Charges at Close of Encounter for Professional Psychological/Neuropsychological Testing Evaluation, Administration, and Scoring Services  88292-[employer] Services Base Code: 1 unit (10/24/2024)  19773-Srosgtminc Services Add-On: 2 units (10/24/2024)  96136-Neuropsychologist Testing and Administration Base Code: 1 unit (10/24/2024)  96137-Neuropsychologist Testing and Administration Add-On: 4 units (10/24/2024)

## 2024-10-30 ENCOUNTER — HOSPITAL ENCOUNTER (OUTPATIENT)
Dept: PSYCHOLOGY | Facility: CLINIC | Age: 74
Discharge: HOME | End: 2024-10-30
Payer: COMMERCIAL

## 2024-10-30 DIAGNOSIS — F02.80 MAJOR NEUROCOGNITIVE DISORDER DUE TO MULTIPLE ETIOLOGIES WITHOUT BEHAVIORAL DISTURBANCE (CMS/HCC): Primary | ICD-10-CM

## 2024-10-30 PROCEDURE — 99999 PR OFFICE/OUTPT VISIT,PROCEDURE ONLY: CPT | Performed by: PSYCHOLOGIST

## 2024-10-31 NOTE — PROGRESS NOTES
Request for Consent  Patient provided consent to treat via telehealth technology.Patient understands the telehealth visit will be billed to their insurance or patient directly.  Patient was informed only the patient, family members, and the clinician are permitted on the telehealth visit, visits are not recorded by the clinician, and the patient is not permitted to record the visit. Patient was provided information on how to access HIPAA compliant platform. Clinician confirmed identification of patient by name and birthdate, provider name, location of patient in [place], and callback number in case disconnected. Patient informed of the right to choose the form of care delivery, including the right to refuse a telehealth visit.     Patient Response to Request for Consent: Yes  Telehealth Platform utilized: telephone  Visit Type performed: Audio only  The patient is at home: Yes  The patient is in [place]:   yes  Those who participated in the encounter: Patient, Provider, and pt's daughter Latisha  Patient Call back number: [phone]    Stephanie Sorto participated in a telephone feedback session to discuss the results of neuropsychological testing performed on 10/24/2024. The full neuropsychological evaluation report can be found in the patient's chart.    The results and recommendations were reviewed with Ms. Sorto and her daughter Latisha during the feedback session, and all questions were answered to their satisfaction. Thank you for the opportunity to participate in Stephanie Sorto's care. Please feel free to contact me at [phone] with any further questions regarding this evaluation.      Respectfully Submitted,     Katelin Hagan Psy.D.  Licensed Clinical Neuropsychologist     Time spent providing telephone neuropsychological feedback: 38 minutes  Charges in addition to services billed for the evaluation encounter on 10/24/2024:  No additional charges

## 2025-01-19 NOTE — PROGRESS NOTES
"Neurology Progress Note    Subjective     Stephanie Sorto is a 74 y.o. female evaluated regarding memory loss.  She comes in today with her daughter Latisha who is her POA and is also a physicians assistant at [employer]..     Stephanie was initially evaluated 3/19/2024.  She is right-hand dominant.  She grew up in [place].  She had some college and then went to [employer] school.  She worked as a  and is a .  She  and moved to [place], [place].  She then .  She moved to [place] 17 years ago to help her daughter with their .  She lived with the daughter for 6 years and then moved independently in a senior apartment.  She complained \"I feel like I am losing memory\".  \"When I first started forgetting things I was panicked\".  She recalled talking to a friend about her oldest grandchild and could not think of the grandchild's name.  She was \"writing everything down\" for 6 months or more.  She managed all of her own finances and affairs.  She had no difficulty with independent function.  She generally slept well but did not sleep last night as she was anxious about this appointment.  She also did not sleep well Saturday night.  She says she did not remember her daughter's birthday this year.  \"Sometimes I think I have too much time on my hands\".  She mentioned that her birth mother had dementia but was uncertain at what age.  She did repeat this.  MMSE was 24/30 with verbal recall 0/3 spontaneously at 5 minutes and 2/3 with categorical hints.  I felt she had mild cognitive impairment and asked her to undergo MRI brain and neuropsychological testing.     She returned 2024 with her daughter.  MRI brain 2024 reported mild to moderate small vessel ischemic changes with age-related volume loss.  To my review this was at least moderately severe.  Patient was seen at the [facility] 2024 after mental status change at a restaurant after 2 martinis.  She had " alcohol intoxication with level 265.  She does have a history of being treated for alcoholism in her earlier adulthood per the daughter.  Apparently the patient was a victim of a financial scam.  The daughter got a call from the bank when the patient asked for a lone.  After going to Social Security she was directed to the [place] police department where the patient made a report but seemed confused according to the police and the daughter was called.  Daughter became POA.  The daughter noticed more short-term memory loss.  Patient was having difficulty adjusting to having a new bank.  1 day she made 10 5 calls to the daughter.  She asked the same question repeatedly. She did not recall seeing her PCP Dr. Mack 5/29/2024.  The patient was not taking any medications although these have been prescribed.    The patient underwent neuropsychological testing 10/24/2024 which was consistent with major neurocognitive disorder due to multiple etiologies.  She demonstrated difficulty in aspects of executive functioning, semantic fluency, and verbal and visual learning and free recall, with adequate recognition; she was not amnestic.  Qualitative concerns include falling for a financial scam (believing she was in a relationship with Jensen Moore), a period of time in which she was not taking prescribed medications, and some forgetfulness regarding clinical history which required her daughter to assist with providing information.  Etiology is likely multifactorial.  MRI (4/2024) showed moderately severe small vessel ischemic changes with age-related volume loss, per Dr. Cherry's review.  This suggests significant vascular burden, which may be playing a role in cognitive impairment.  Also of concern is chronic alcohol use, although this appears to be more remote and is unclear how much she has been drinking recently.  She is not amnestic on testing, given benefit from recognition cues; however, a progressive neurodegenerative  process such as Alzheimer's disease cannot be ruled out given some of her other day-to-day memory concerns.      The daughter arranged for intake from the Department of Aging.  They were told that she qualifies for nursing home level of care as she is not taking her medicines correctly.  Apparently she missed doses and at times took methotrexate daily rather than weekly.  She lives in an apartment independently in an [place] community.  She does not drive.  Daughter has taken over her finances.  She calls the daughter multiple times daily.  She could not find her cognitive testing report nor could she find her calendar today.  She asked multiple times where they were going.  She did not remember seeing me in the past.  She is independent in ADLs.  I again reviewed her MRI brain of 4/4/2024 showing at least moderate chronic microvascular ischemic change.      Medical History:   Past Medical History:   Diagnosis Date    HLD (hyperlipidemia)     RA (rheumatoid arthritis) (CMS/[facility])        Surgical History: No past surgical history on file.    Allergies: Sulfa (sulfonamide antibiotics)    Current Outpatient Medications   Medication Sig Dispense Refill    adalimumab (HUMIRA,CF,) 40 mg/0.4 mL syringe kit Inject 40 mg under the skin.      amLODIPine (NORVASC) 10 mg tablet Take 1 tablet by mouth daily.      donepeziL (ARICEPT) 5 mg tablet Take 1 tablet (5 mg total) by mouth daily. 30 tablet 0    DULoxetine (CYMBALTA) 30 mg capsule take 1 capsule by mouth daily as directed       No current facility-administered medications for this visit.         Family History:   Family History   Problem Relation Name Age of Onset    Dementia Biological Mother      Arthritis Biological Sister         Social History:   Social History     Socioeconomic History    Marital status: Single     Spouse name: None    Number of children: None    Years of education: None    Highest education level: None   Tobacco Use    Smoking status: Never     "Smokeless tobacco: Never   Substance and Sexual Activity    Alcohol use: Never         Objective     Physical Exam  Visit Vitals  BP (!) 140/82   Pulse 88   Ht 1.575 m (5' 2\")   Wt 76.2 kg (168 lb)   SpO2 98%   BMI 30.73 kg/m²       Higher cortical function: Mild cognitive impairment, language intact.  Set her daughter lives in [place] rather than [place].  Cranial nerves: Cranial nerves II through XII normal.  Motor: Normal strength and rapid movements throughout.    Cerebellum: No dysmetria.  Gait: Hunched posture and shortened steppage with or without a walker  General: Awake, alert, in no apparent distress.  HEENT: Normocephalic atraumatic.  Eyes: Orbits benign,  extraocular motions full.  Neck : Supple, no carotid bruit.    Lungs: Clear bilaterally.  Heart: S1 and S2 normal, regular rate and rhythm, no murmur.  Extremities: No clubbing, cyanosis, or edema.  Musculoskeletal: No injury or deformity.  Psychiatric: Appropriate and cooperative  Assessment and Plan    Major neurocognitive disorder (CMS/HCC)  We discussed getting the patient the care and support that she needs in the home setting.  The daughter is helping to arrange this.  The daughter is now managing finances and is POA.  Patient is no longer consuming alcohol.  Vascular risk factor control to help diminish evolution of microvascular ischemic changes appropriate.  She will try adding Aricept to see if this helps her clinically.  I will reassess her in the spring.    The patient and her daughter were provided with copies of her neuropsychological testing.    Bruce Cherry MD  1:19 PM  "

## 2025-01-21 ENCOUNTER — OFFICE VISIT (OUTPATIENT)
Dept: NEUROLOGY | Facility: CLINIC | Age: 75
End: 2025-01-21
Payer: COMMERCIAL

## 2025-01-21 VITALS
WEIGHT: 168 LBS | HEART RATE: 88 BPM | SYSTOLIC BLOOD PRESSURE: 140 MMHG | BODY MASS INDEX: 30.91 KG/M2 | HEIGHT: 62 IN | OXYGEN SATURATION: 98 % | DIASTOLIC BLOOD PRESSURE: 82 MMHG

## 2025-01-21 DIAGNOSIS — F03.90 MAJOR NEUROCOGNITIVE DISORDER (CMS/HCC): Primary | ICD-10-CM

## 2025-01-21 PROCEDURE — 3079F DIAST BP 80-89 MM HG: CPT | Performed by: PSYCHIATRY & NEUROLOGY

## 2025-01-21 PROCEDURE — 3008F BODY MASS INDEX DOCD: CPT | Performed by: PSYCHIATRY & NEUROLOGY

## 2025-01-21 PROCEDURE — 3077F SYST BP >= 140 MM HG: CPT | Performed by: PSYCHIATRY & NEUROLOGY

## 2025-01-21 PROCEDURE — 99215 OFFICE O/P EST HI 40 MIN: CPT | Performed by: PSYCHIATRY & NEUROLOGY

## 2025-01-21 RX ORDER — DONEPEZIL HYDROCHLORIDE 5 MG/1
5 TABLET, FILM COATED ORAL DAILY
Qty: 30 TABLET | Refills: 0 | Status: SHIPPED | OUTPATIENT
Start: 2025-01-21 | End: 2025-02-14

## 2025-01-21 NOTE — ASSESSMENT & PLAN NOTE
We discussed getting the patient the care and support that she needs in the home setting.  The daughter is helping to arrange this.  The daughter is now managing finances and is POA.  Patient is no longer consuming alcohol.  Vascular risk factor control to help diminish evolution of microvascular ischemic changes appropriate.  She will try adding Aricept to see if this helps her clinically.  I will reassess her in the spring.

## 2025-02-14 DIAGNOSIS — F03.90 MAJOR NEUROCOGNITIVE DISORDER (CMS/HCC): ICD-10-CM

## 2025-02-14 RX ORDER — DONEPEZIL HYDROCHLORIDE 10 MG/1
10 TABLET, FILM COATED ORAL DAILY
Qty: 30 TABLET | Refills: 5 | Status: SHIPPED | OUTPATIENT
Start: 2025-02-14 | End: 2025-08-13

## 2025-02-14 NOTE — TELEPHONE ENCOUNTER
Neurology NON CONTROL Medication Refills:      Last fill date:1/21/25   # of refills provided:0   Last office visit date:1/21/25   Next office visit date:5/27/25   When instructed to follow up: 4 months    Any recent cx/no show appts:

## 2025-06-08 NOTE — PROGRESS NOTES
"Neurology Progress Note    Subjective     Stephanie Sorto is a 74 y.o. female evaluated regarding memory loss.  She comes in today with staff from [facility].her daughter Latisha who is her POA and is also a physicians assistant at [employer]..     Stephanie was initially evaluated 3/19/2024.  She is right-hand dominant.  She grew up in [place].  She had some college and then went to [employer] school.  She worked as a  and is a .  She  and moved to [place], [place].  She then .  She moved to [place] 17 years ago to help her daughter with their .  She lived with the daughter for 6 years and then moved independently in a senior apartment.  She complained \"I feel like I am losing memory\".  \"When I first started forgetting things I was panicked\".  She recalled talking to a friend about her oldest grandchild and could not think of the grandchild's name.  She was \"writing everything down\" for 6 months or more.  She managed all of her own finances and affairs.  She had no difficulty with independent function.  She generally slept well but did not sleep last night as she was anxious about this appointment.  She also did not sleep well Saturday night.  She says she did not remember her daughter's birthday this year.  \"Sometimes I think I have too much time on my hands\".  She mentioned that her birth mother had dementia but was uncertain at what age.  She did repeat this.  MMSE was 24/30 with verbal recall 0/3 spontaneously at 5 minutes and 2/3 with categorical hints.  I felt she had mild cognitive impairment and asked her to undergo MRI brain and neuropsychological testing.      She returned 2024 with her daughter.  MRI brain 2024 reported mild to moderate small vessel ischemic changes with age-related volume loss.  To my review this was at least moderately severe.  Patient was seen at the [facility] ER 2024 after mental status change at a restaurant after 2 " song.  She had alcohol intoxication with level 265.  She does have a history of being treated for alcoholism in her earlier adulthood per the daughter.  Apparently the patient was a victim of a financial scam.  The daughter got a call from the bank when the patient asked for a lone.  After going to Social Security she was directed to the [place] police department where the patient made a report but seemed confused according to the police and the daughter was called.  Daughter became POA.  The daughter noticed more short-term memory loss.  Patient was having difficulty adjusting to having a new bank.  1 day she made 10 phone calls to the daughter.  She asked the same question repeatedly. She did not recall seeing her PCP Dr. Mack 5/29/2024.  The patient was not taking any medications although these have been prescribed.     The patient underwent neuropsychological testing 10/24/2024 which was consistent with major neurocognitive disorder due to multiple etiologies.  She demonstrated difficulty in aspects of executive functioning, semantic fluency, and verbal and visual learning and free recall, with adequate recognition; she was not amnestic.  Qualitative concerns include falling for a financial scam (believing she was in a relationship with Jensen Moore), a period of time in which she was not taking prescribed medications, and some forgetfulness regarding clinical history which required her daughter to assist with providing information.  Etiology is likely multifactorial.  MRI (4/2024) showed moderately severe small vessel ischemic changes with age-related volume loss, per Dr. Cherry's review.  This suggests significant vascular burden, which may be playing a role in cognitive impairment.  Also of concern is chronic alcohol use, although this appears to be more remote and is unclear how much she has been drinking recently.  She is not amnestic on testing, given benefit from recognition cues; however, a  "progressive neurodegenerative process such as Alzheimer's disease cannot be ruled out given some of her other day-to-day memory concerns.       The patient was seen 1/21/2025.  The daughter Latisha arranged for intake from the Department of Aging.  They were told that she qualified for nursing home level of care as she was not taking her medicines correctly.  Apparently she missed doses and at times took methotrexate daily rather than weekly.  She lived in an apartment independently in an [place] community.  She did not drive.  Daughter had taken over her finances.  She called the daughter multiple times daily.  She could not find her cognitive testing report nor could she find her calendar today.  She asked multiple times where they were going.  She did not remember seeing me in the past.  She was independent in ADLs.  I again reviewed her MRI brain of 4/4/2024 showing at least moderate chronic microvascular ischemic change.  To her symptoms initiated.    At this time the patient is brought by staff from [facility].  Records indicate she entered that facility 3/19/2025 (although the patient said it was about 1 month ago).  She states \"I am very happy and there\".  She is apparently on a standard unit and not on a specific memory unit.  No behavioral changes per staff or the daughter.  The patient states \"my memory has left me\".     Medical History:   Past Medical History:   Diagnosis Date    Anxiety     Constipation     Dementia (CMS/HCC)     Dementia with psychotic disturbance (CMS/[facility])     HLD (hyperlipidemia)     Mood and affect disturbance     Muscle weakness     RA (rheumatoid arthritis) (CMS/[facility])     Unspecified lack of coordination        Surgical History: No past surgical history on file.    Allergies: Sulfa (sulfonamide antibiotics)    Current Outpatient Medications   Medication Sig Dispense Refill    acetaminophen (TYLENOL) 325 mg tablet Take 325 mg by mouth every 4 (four) hours as needed for mild pain.      " "acetaminophen (TYLENOL) 500 mg tablet Take 500 mg by mouth every 6 (six) hours as needed for mild pain.      adalimumab (HUMIRA,CF,) 40 mg/0.4 mL syringe kit Inject 40 mg under the skin.      amLODIPine (NORVASC) 10 mg tablet Take 1 tablet by mouth daily.      atorvastatin (LIPITOR) 20 mg tablet Take 20 mg by mouth daily.      bisacodyL (DULCOLAX) 10 mg suppository Insert 10 mg into the rectum daily. Insert 1 suppository rectally every 72 hours as needed for bowel management      diclofenac sodium (VOLTAREN) 1 % topical gel Apply 1 g topically 2 (two) times a day as needed for pain.      donepeziL (ARICEPT) 10 mg tablet Take 1 tablet (10 mg total) by mouth daily. 30 tablet 5    folic acid (FOLVITE) 1 mg tablet Take 1 mg by mouth daily.      magnesium hydroxide (M.O.M.) 400 mg/5 mL suspension Take 30 mL by mouth. Give 30ml by mouth every 24 hours as needed for constipation.      methotrexate (TREXALL) 2.5 mg tablet Take  2.5 mg once a week      sodium phosphates (FLEET) enema Insert 1 enema into the rectum once. Insert 1 suppository rectally every 72 hours as needed for bowel management      DULoxetine (CYMBALTA) 30 mg capsule take 1 capsule by mouth daily as directed       No current facility-administered medications for this visit.         Family History:   Family History   Problem Relation Name Age of Onset    Dementia Biological Mother      Arthritis Biological Sister         Social History:   Social History     Socioeconomic History    Marital status: Single     Spouse name: None    Number of children: None    Years of education: None    Highest education level: None   Tobacco Use    Smoking status: Never    Smokeless tobacco: Never   Substance and Sexual Activity    Alcohol use: Never    Sexual activity: Not Currently         Objective     Physical Exam  Visit Vitals  /67 (BP Location: Left upper arm, Patient Position: Sitting)   Pulse 78   Resp 18   Ht 1.626 m (5' 4\")   Wt 76.1 kg (167 lb 12.8 oz)   SpO2 " 98%   BMI 28.80 kg/m²     Higher cortical function: Language intact.  If the year is 1975.    Cranial nerves: Cranial nerves II through XII normal.  Motor: Normal strength and rapid movements throughout.    Cerebellum: No dysmetria.  Gait: Hunched posture and shortened steppage with a walker  General: Awake, alert, in no apparent distress.  HEENT: Normocephalic atraumatic.  Eyes: Orbits benign,  extraocular motions full.  Neck : Supple, no carotid bruit.    Lungs: Clear bilaterally.  Heart: S1 and S2 normal, regular rate and rhythm, no murmur.  Extremities: No clubbing, cyanosis, or edema.  Musculoskeletal: No injury or deformity.  Psychiatric: Appropriate and cooperative    Assessment and Plan    Major neurocognitive disorder (CMS/HCC)  Well cared for and happy at [facility].  The daughter said she had some upset stomach and that Aricept was moved to the evening.  I made no changes in her regimen.  I will reassess her in 6 months.      Bruce Cherry MD  1:09 PM

## 2025-06-09 ENCOUNTER — OFFICE VISIT (OUTPATIENT)
Facility: HOSPITAL | Age: 75
End: 2025-06-09
Payer: COMMERCIAL

## 2025-06-09 VITALS
RESPIRATION RATE: 18 BRPM | BODY MASS INDEX: 28.65 KG/M2 | HEART RATE: 78 BPM | SYSTOLIC BLOOD PRESSURE: 122 MMHG | HEIGHT: 64 IN | OXYGEN SATURATION: 98 % | DIASTOLIC BLOOD PRESSURE: 67 MMHG | WEIGHT: 167.8 LBS

## 2025-06-09 DIAGNOSIS — F03.90 MAJOR NEUROCOGNITIVE DISORDER (CMS/HCC): Primary | ICD-10-CM

## 2025-06-09 PROCEDURE — 3008F BODY MASS INDEX DOCD: CPT | Performed by: PSYCHIATRY & NEUROLOGY

## 2025-06-09 PROCEDURE — G0463 HOSPITAL OUTPT CLINIC VISIT: HCPCS | Performed by: PSYCHIATRY & NEUROLOGY

## 2025-06-09 PROCEDURE — 99213 OFFICE O/P EST LOW 20 MIN: CPT | Performed by: PSYCHIATRY & NEUROLOGY

## 2025-06-09 PROCEDURE — 3074F SYST BP LT 130 MM HG: CPT | Performed by: PSYCHIATRY & NEUROLOGY

## 2025-06-09 PROCEDURE — 3078F DIAST BP <80 MM HG: CPT | Performed by: PSYCHIATRY & NEUROLOGY

## 2025-06-09 RX ORDER — DICLOFENAC SODIUM 10 MG/G
1 GEL TOPICAL 2 TIMES DAILY PRN
COMMUNITY

## 2025-06-09 RX ORDER — METHOTREXATE 2.5 MG/1
2.5 TABLET ORAL WEEKLY
COMMUNITY
Start: 2025-03-19

## 2025-06-09 RX ORDER — ADHESIVE BANDAGE
30 BANDAGE TOPICAL
COMMUNITY
Start: 2025-03-19

## 2025-06-09 RX ORDER — ACETAMINOPHEN 500 MG
500 TABLET ORAL EVERY 6 HOURS PRN
COMMUNITY
Start: 2025-03-19

## 2025-06-09 RX ORDER — BISACODYL 10 MG/1
10 SUPPOSITORY RECTAL DAILY
COMMUNITY
Start: 2025-03-19

## 2025-06-09 RX ORDER — ACETAMINOPHEN 325 MG/1
325 TABLET ORAL EVERY 4 HOURS PRN
COMMUNITY

## 2025-06-09 RX ORDER — FOLIC ACID 1 MG/1
1 TABLET ORAL DAILY
COMMUNITY
Start: 2025-03-19

## 2025-06-09 RX ORDER — ATORVASTATIN CALCIUM 20 MG/1
20 TABLET, FILM COATED ORAL DAILY
COMMUNITY
Start: 2025-03-19

## 2025-06-09 NOTE — ASSESSMENT & PLAN NOTE
Well cared for and happy at [facility].  The daughter said she had some upset stomach and that Aricept was moved to the evening.  I made no changes in her regimen.  I will reassess her in 6 months.